# Patient Record
Sex: MALE | Race: WHITE | NOT HISPANIC OR LATINO | Employment: FULL TIME | URBAN - METROPOLITAN AREA
[De-identification: names, ages, dates, MRNs, and addresses within clinical notes are randomized per-mention and may not be internally consistent; named-entity substitution may affect disease eponyms.]

---

## 2017-03-06 ENCOUNTER — OFFICE VISIT - RIVER FALLS (OUTPATIENT)
Dept: FAMILY MEDICINE | Facility: CLINIC | Age: 27
End: 2017-03-06

## 2017-03-06 ASSESSMENT — MIFFLIN-ST. JEOR: SCORE: 1966.86

## 2018-07-19 ENCOUNTER — OFFICE VISIT - RIVER FALLS (OUTPATIENT)
Dept: FAMILY MEDICINE | Facility: CLINIC | Age: 28
End: 2018-07-19

## 2018-07-30 ENCOUNTER — OFFICE VISIT - RIVER FALLS (OUTPATIENT)
Dept: FAMILY MEDICINE | Facility: CLINIC | Age: 28
End: 2018-07-30

## 2018-07-30 ASSESSMENT — MIFFLIN-ST. JEOR: SCORE: 1910.61

## 2020-10-12 ENCOUNTER — AMBULATORY - RIVER FALLS (OUTPATIENT)
Dept: FAMILY MEDICINE | Facility: CLINIC | Age: 30
End: 2020-10-12
Payer: COMMERCIAL

## 2021-04-22 ENCOUNTER — AMBULATORY - RIVER FALLS (OUTPATIENT)
Dept: FAMILY MEDICINE | Facility: CLINIC | Age: 31
End: 2021-04-22
Payer: COMMERCIAL

## 2021-05-20 ENCOUNTER — AMBULATORY - RIVER FALLS (OUTPATIENT)
Dept: FAMILY MEDICINE | Facility: CLINIC | Age: 31
End: 2021-05-20
Payer: COMMERCIAL

## 2021-06-02 ENCOUNTER — RECORDS - HEALTHEAST (OUTPATIENT)
Dept: ADMINISTRATIVE | Facility: CLINIC | Age: 31
End: 2021-06-02

## 2022-02-11 VITALS
HEIGHT: 71 IN | TEMPERATURE: 98.8 F | HEART RATE: 65 BPM | TEMPERATURE: 98.2 F | SYSTOLIC BLOOD PRESSURE: 133 MMHG | DIASTOLIC BLOOD PRESSURE: 72 MMHG | HEART RATE: 74 BPM | DIASTOLIC BLOOD PRESSURE: 88 MMHG | SYSTOLIC BLOOD PRESSURE: 112 MMHG | WEIGHT: 208 LBS | BODY MASS INDEX: 29.12 KG/M2 | WEIGHT: 209 LBS

## 2022-02-11 VITALS
HEART RATE: 74 BPM | SYSTOLIC BLOOD PRESSURE: 116 MMHG | BODY MASS INDEX: 30.85 KG/M2 | DIASTOLIC BLOOD PRESSURE: 72 MMHG | HEIGHT: 71 IN | WEIGHT: 220.4 LBS | TEMPERATURE: 98.1 F

## 2022-02-16 NOTE — PROGRESS NOTES
Chief Complaint    F/u left arm cellulitis 7/19/18. Still c/o pain.  History of Present Illness      Chief complaint as above reviewed and confirmed with patient.  Pt presents to the clinic with concerns re: recheck of celluliits.  He was seen 7-19-18.  He admits he did not take the clindamycin regularily, had to take 4 x per day. has about 8 tablets left he thinks.   Generally much better with decreased erythema, pain and swelling.  has noted some firmness to the veins of the forearm.  Had some axillary tenderness last week which is improved this week.  no fevers.  Generally much better but not resolved.  not certain what caused the problem to begin with. Dad has a history of DVT and concerned the firmness in the vein could respresent a venous thrombosis.  Review of Systems      Review of systems is negative with the exception of those noted in HPI          Physical Exam   Vitals & Measurements    T: 98.2   F (Tympanic)  HR: 74(Peripheral)  BP: 112/72     HT: 71 in  WT: 208 lb  BMI: 29.01       exam of the LUE reveals minimal pink erythema medial elbow, ulnar forearm to the mid forearm and extending proximal to the elbow about 6 cm medially. no lateral erythema. no swelling noted at this time. no joint effusion. no open wounds.  There is a slight induration of the R medial and dosal forearm along a superficial vein that is about 2 cm in lenght but quite minimal at this time.  pt notes it is improved from several days ago.       peripheral pulses intact, cap refill brisk.          Assessment/Plan       1. Right arm cellulitis (L03.113)        additional 7 days of abx given minimal pinkness remaining though generally much improved.  doxycycline chosen as pt does not feel he can take abx 4 x per day or even 3 x per day.  warm compresses, elevation.  At this time no sigificant swelling, not likely DVT given UE no trauma or venous cannulation.  the firmness may be superficial thrombophlebitis but also given infection  may be more of a lymphangitis which owuld account for the tenderness in the axilla as well that is currently resolved. recheck if worsening. if worsening swelling consider US.       Orders:         doxycycline, 1 tab(s) ( 100 mg ), PO, bid, # 14 tab(s), 0 Refill(s), Type: Maintenance, Pharmacy: [facility], 1 tab(s) Oral bid,x7 day(s), (Ordered)  Patient Information     Name:BLAKE SOLOMON      Address:      [address] [place] [id number]     Sex:Male     YOB: 1990     [phone]     Emergency Contact:YONY SOLOMON     MRN:[id number]     FIN:[id number]     Location:[facility]     Date of Service:07/30/2018      Primary Care Physician:       TOMEKA       Attending Physician:       Anali GERMAIN, Manju HARVEY, [phone]  Problem List/Past Medical History    Ongoing     Asthma     Nodule       Comments: in the left submentum     Obesity    Historical     Otitis media, recurrent  Procedure/Surgical History     Herniorrhaphy (1991)            Comments:      bilateral     Myringotomy and insertion of T tube (01/1990)        Medications     clindamycin 150 mg oral capsule: 150 mg, 1 cap(s), PO, qid, for 10 day(s), 40 cap(s), 0 Refill(s).     doxycycline monohydrate 100 mg oral tablet: 100 mg, 1 tab(s), PO, bid, for 7 day(s), 14 tab(s), 0 Refill(s).          Allergies    Augmentin    Ceclor    Septra  Social History    Smoking Status - 07/30/2018     Never smoker     Tobacco      Never, 05/16/2013  Immunizations      Vaccine Date Status      tetanus/diphth/pertuss (Tdap) adult/adol 03/17/2016 Given      influenza virus vaccine, inactivated 10/19/2015 Given      influenza virus vaccine, inactivated 09/08/2014 Given      influenza, H1N1, inactivated 11/18/2009 Recorded      tetanus/diphth/pertuss (Tdap) adult/adol 03/07/2006 Recorded      Hep B 03/05/2001 Recorded      Hep B 11/09/2000 Recorded      Hep B 09/14/2000 Recorded      influenza  10/13/1997 Recorded      influenza 10/15/1996 Recorded      MMR (measles/mumps/rubella) 04/26/1993 Recorded      OPV 06/07/1990 Recorded      DTaP 06/07/1990 Recorded      OPV 01/15/1990 Recorded      DTaP 01/15/1990 Recorded      MMR (measles/mumps/rubella) 10/10/1989 Recorded      DTaP 01/12/1989 Recorded      OPV 1988 Recorded      DTaP 1988 Recorded      OPV 1988 Recorded      DTaP 1988 Recorded

## 2022-02-16 NOTE — PROGRESS NOTES
Chief Complaint    left elbow pain, started last week, getting red and painful - no injury  History of Present Illness      Patient complains of a tender erythematous area along the inner aspect of elbow for about the past week.  No injury or trauma.  No fever or chills.  It is more tender than painful.  Review of Systems      Patient has a history of MRSA.  No fevers, chills, myalgias, headache, cough, dyspnea.  Physical Exam   Vitals & Measurements    T: 98.8   F (Tympanic)  HR: 65(Peripheral)  BP: 133/88       Patient appears comfortable and in no distress.  Alert and oriented.  Clear.  Heart exam regular.  Left arm has a faint erythematous rash inner aspect extending 6 several centimeters both above and below the elbow.  No palpable cord.  Mild tenderness.  A photograph is taken using capture.  Assessment/Plan       Cellulitis of left arm(L03.114)        Given his history of is a will treat with and does not tolerate sulfa.  The lesion looks more suggestive of strep and staph.  Return if fever, illness, not improving in 2-3 days or resolving.         Orders:          [id number] office outpatient visit 15 minutes (Charge), Quantity: 1, Cellulitis of left arm       Orders:         clindamycin, 1 cap(s) ( 150 mg ), PO, qid, x 10 day(s), # 40 cap(s), 0 Refill(s), Type: Acute, Pharmacy: [facility] [place], patient called this morning regarding drainage from ear and said he has amoxicillin to get filled today - per our records..., (Ordered)  Patient Information     Name:BLAKE SOLOMON      Address:      [address] [place] [address]      Sex:Male      YOB: 1990      [phone]      Emergency Contact:YONY SOLOMON     MRN:[id number]     FIN:[id number]     Location:[facility]     Date of Service:07/19/2018      Primary Care Physician:       NONE ,       Attending Physician:       Berlin Vann MD, [phone]  Problem List/Past Medical History    Ongoing     Asthma      Nodule       Comments: in the left submentum     Obesity    Historical     Otitis media, recurrent  Procedure/Surgical History     Herniorrhaphy (1991)     Myringotomy and insertion of T tube (01/1990)  Medications        clindamycin 150 mg oral capsule: 150 mg, 1 cap(s), PO, qid, for 10 day(s), 40 cap(s), 0 Refill(s).                Allergies    Augmentin    Ceclor    Septra  Social History    Smoking Status - 07/19/2018     Never smoker     Tobacco      Never, 05/16/2013  Immunizations      Vaccine Date Status      tetanus/diphth/pertuss (Tdap) adult/adol 03/17/2016 Given      influenza virus vaccine, inactivated 10/19/2015 Given      influenza virus vaccine, inactivated 09/08/2014 Given      influenza, H1N1, inactivated 11/18/2009 Recorded      tetanus/diphth/pertuss (Tdap) adult/adol 03/07/2006 Recorded      Hep B 03/05/2001 Recorded      Hep B 11/09/2000 Recorded      Hep B 09/14/2000 Recorded      influenza 10/13/1997 Recorded      influenza 10/15/1996 Recorded      MMR (measles/mumps/rubella) 04/26/1993 Recorded      OPV 06/07/1990 Recorded      DTaP 06/07/1990 Recorded      OPV 01/15/1990 Recorded      DTaP 01/15/1990 Recorded      MMR (measles/mumps/rubella) 10/10/1989 Recorded      DTaP 01/12/1989 Recorded      OPV 1988 Recorded      DTaP 1988 Recorded      OPV 1988 Recorded      DTaP 1988 Recorded

## 2022-02-16 NOTE — PROGRESS NOTES
Patient:   BLAKE SOLOMON            MRN: [id number]            FIN: [id number]               Age:   26 years     Sex:  Male     :  1990   Associated Diagnoses:   Left knee pain   Author:   Any Narvaez      Chief Complaint   3/6/2017 9:17 AM CST     Pt here for ongoing left knee pain x 3 months. No injuries that he knows of to the knee.        History of Present Illness   Symptoms and concerns as expressed in CC reviewed and confirmed with patient. He is a bowler. He tends to plan rather than slide his foot and feels like there has been wear and tear on his knee. Has bothered him about 2 months. No acute injury with bowling; however, he did slip and fall walking the dog about 1 month ago and feels pain below knee cap. Hurts if he is kneeling on the floor changing his child's diaper.  He is afraid of buckling but it has not buckled. Has used some ice and ibuprofen  No PMH knee surgery      Health Status   Allergies:    Allergic Reactions (Selected)  Severity Not Documented  Augmentin (No reactions were documented)  Ceclor (No reactions were documented)  Septra (No reactions were documented)   Medications:  (Selected)      Problem list:    All Problems  Obesity / SNOMED CT 0968830551 / Probable  Asthma / ICD-9-.90 / Confirmed  Nodule / ICD-9-.2 / Confirmed  in the left submentum  Resolved: Otitis media, recurrent / ICD-9-.9      Histories   Past Medical History:    Active  Nodule (782.2): Onset in  at 15 years.  Comments:  2012 CDT 10:48 AM CDT - Venus Martin  in the left submentum  Asthma (493.90)  Resolved  Otitis media, recurrent (382.9):  Resolved.   Family History:    No family history items have been selected or recorded.   Procedure history:    Herniorrhaphy (SNOMED CT 19255762) in  at 1 Years.  Comments:  2012 10:32 AM - Venus Martin  bilateral  Myringotomy and insertion of T tube (SNOMED CT 147041646) in the month of 1990 at 15 Months.   Social History:         Tobacco Assessment            Never        Physical Examination   Vital Signs   3/6/2017 9:17 AM CST Temperature Tympanic 98.1 DegF    Peripheral Pulse Rate 74 bpm    Pulse Site Radial artery    Systolic Blood Pressure 116 mmHg    Diastolic Blood Pressure 72 mmHg    Mean Arterial Pressure 87 mmHg    BP Site Right arm      Measurements from flowsheet : Measurements   3/6/2017 9:17 AM CST Height Measured - Standard 71 in    Weight Measured - Standard 220.4 lb    BSA 2.24 m2    Body Mass Index 30.74 kg/m2      General:  Alert and oriented, No acute distress.    Musculoskeletal:  Normal gait, left knee no swelling or wwarmth or redness or bruising  Is tender with palpation over the bursa, just below knee cap  No laxity noted with valgus or varus or ant/post drawer maneuvers.       Review / Management   Radiology results   X-ray, X-ray reviewed with patient, no abnormality noted.  Will await radiology over-read and if differs such that treatment would be altered,  will notify patient.       Impression and Plan   Diagnosis     Left knee pain (TWZ25-EG M25.562).     Patient Instructions:       Counseled: Patient, Regarding diagnosis, Regarding treatment, Regarding medications, Verbalized understanding, Suspect bursitis after fall, has been using ibuprofen, will have ortho eval  Doubt ACL , MCL or Posterior liga ment tear with bowling but likely strain, will have ortho further eval this as well.    Orders     Orders (Selected)   Outpatient Orders  Order  Referral (Request): 3/6/2017 9:33 AM CST, Referred to: Orthopaedics, Left knee pain.

## 2022-02-16 NOTE — TELEPHONE ENCOUNTER
---------------------  From: Kathleen Mauricio LPN (Phone Messages Pool ([id number]_[facility]))   Sent: 11/18/2020 5:05:51 PM CST  Subject: covid questions     Phone Message    PCP:   unknown      Time of Call:  4:53pm       Person Calling:  pt  Phone number:  [phone]    Returned call at: 4:58pm    Note:   Pt LM stating he has some covid questions.    Returned call and pt says his son had a friend stay overnight on Saturday. Friend's mom picked him up Sunday morning- friend's mom started having symptoms of covid Sunday afternoon and tested positive on Monday.    Pt is wondering what they should do- positive mom was only in their house less than 5 minutes on Sunday. Told pt that its possible that her son could be negative or he could have been positive and asymptomatic at their home. Suggested quarantine and video appt if symptoms develop.    Last office visit and reason:  7/13/18 cellulitis arm

## 2022-05-12 PROBLEM — J45.909 ASTHMA: Status: ACTIVE | Noted: 2022-05-12

## 2022-05-16 ENCOUNTER — OFFICE VISIT (OUTPATIENT)
Dept: FAMILY MEDICINE | Facility: CLINIC | Age: 32
End: 2022-05-16
Payer: COMMERCIAL

## 2022-05-16 VITALS
SYSTOLIC BLOOD PRESSURE: 136 MMHG | HEART RATE: 77 BPM | WEIGHT: 222.9 LBS | HEIGHT: 71 IN | DIASTOLIC BLOOD PRESSURE: 87 MMHG | TEMPERATURE: 97.6 F | BODY MASS INDEX: 31.21 KG/M2

## 2022-05-16 DIAGNOSIS — L98.9 SCALP LESION: ICD-10-CM

## 2022-05-16 DIAGNOSIS — R22.2 SUBCUTANEOUS MASS OF ABDOMINAL WALL: Primary | ICD-10-CM

## 2022-05-16 PROCEDURE — 99213 OFFICE O/P EST LOW 20 MIN: CPT | Performed by: FAMILY MEDICINE

## 2022-05-16 NOTE — PROGRESS NOTES
"  Assessment & Plan     Subcutaneous mass of abdominal wall  Subcutaneous mass with temperature up to include lipoma, epidermoid cyst, muscle abnormality  Ultrasound ordered for further evaluation  - [place] UNLISTED ULTRASOUND PROCEDURE; Future    Scalp lesion  Scalp lesions appear to be junctional nevi, return as needed for removal               BMI:   Estimated body mass index is 31.09 kg/m  as calculated from the following:    Height as of this encounter: 1.803 m (5' 11\").    Weight as of this encounter: 101.1 kg (222 lb 14.4 oz).           No follow-ups on file.    Samm Anthony MD  [facility]    Luis Thompson is a 31 year old who presents for the following health issues  accompanied by his spouse.    History of Present Illness       Back Pain:  He presents for follow up of back pain. Patient's back pain is a recurring problem.  Location of back pain:  Right lower back and left lower back  Description of back pain: dull ache and shooting  Back pain spreads: right buttocks and left buttocks    Since patient first noticed back pain, pain is: always present, but gets better and worse  Does back pain interfere with his job:  No      Reason for visit:  Lump check  Symptom onset:  More than a month  Symptoms include:  Discomfort  Symptom intensity:  Mild  Symptom progression:  Worsening  Had these symptoms before:  No    He eats 0-1 servings of fruits and vegetables daily.He consumes 2 sweetened beverage(s) daily.He exercises with enough effort to increase his heart rate 10 to 19 minutes per day.  He exercises with enough effort to increase his heart rate 3 or less days per week.   He is taking medications regularly.       Check lump on right side abdomen.  Noticed about a year ago and is having increased discomfort.  He has 2 large nevi on the scalp he like to have checked today.  Also has questions on preventive health measures      Review of Systems   Constitutional, HEENT, " "cardiovascular, pulmonary, gi and gu systems are negative, except as otherwise noted.      Objective    /87 (BP Location: Right arm, Cuff Size: Adult Large)   Pulse 77   Temp 97.6  F (36.4  C) (Tympanic)   Ht 1.803 m (5' 11\")   Wt 101.1 kg (222 lb 14.4 oz)   BMI 31.09 kg/m    Body mass index is 31.09 kg/m .  Physical Exam   Alert, oriented, no acute distress  Ear canals patent, TMs clear  Lungs are clear  Heart has a regular rhythm  Abdomen soft, nontender, 1.5 cm nontender, mobile mass right lower abdomen  Skin exam reveals two 6 mm nonpigmented lesions on the left parietal scalp                "

## 2022-06-03 ENCOUNTER — TELEPHONE (OUTPATIENT)
Dept: FAMILY MEDICINE | Facility: CLINIC | Age: 32
End: 2022-06-03
Payer: COMMERCIAL

## 2022-06-03 DIAGNOSIS — R22.2 SUBCUTANEOUS MASS OF ABDOMINAL WALL: Primary | ICD-10-CM

## 2022-06-03 NOTE — TELEPHONE ENCOUNTER
Reason for Call:  Order     Detailed comments: Gisselle from [facility] calling she is stating that the referral needs a different diagnosis code and they are a little confused as to what needs to be looked at. Please return call to Gisselle @ [phone]     Phone Number Patient can be reached at: Other phone number:  [phone]    Best Time: any     Can we leave a detailed message on this number? Not Applicable    Call taken on 6/3/2022 at 12:26 PM by Radha Garcia

## 2022-06-06 NOTE — TELEPHONE ENCOUNTER
[facility] returned call to Gisselle, call was disconnected.   Will try calling later.

## 2022-06-07 ENCOUNTER — ANCILLARY PROCEDURE (OUTPATIENT)
Dept: RADIOLOGY | Facility: CLINIC | Age: 32
End: 2022-06-07
Attending: FAMILY MEDICINE
Payer: COMMERCIAL

## 2022-06-07 NOTE — TELEPHONE ENCOUNTER
Left message for a return call - ? Regarding ultrasound that was ordered at visit last month for abd mass?  See GTG note

## 2022-06-07 NOTE — TELEPHONE ENCOUNTER
TC received from [facility], requesting an updated ultrasound order that specifies exact location they are do complete the procedure on.   Please place updated order.       OV 5/16/22 subcutaneous mass of abdominal wall GTG

## 2022-06-07 NOTE — TELEPHONE ENCOUNTER
Jay calling wondering if this order can be sent as soon as possible he was scheduled today but was unable to have ultrasound done as the order was wrong. Please send new order to [facility].

## 2022-07-17 ENCOUNTER — HEALTH MAINTENANCE LETTER (OUTPATIENT)
Age: 32
End: 2022-07-17

## 2022-09-25 ENCOUNTER — HEALTH MAINTENANCE LETTER (OUTPATIENT)
Age: 32
End: 2022-09-25

## 2022-11-28 PROBLEM — J45.909 ASTHMA: Status: RESOLVED | Noted: 2022-05-12 | Resolved: 2022-11-28

## 2023-08-05 ENCOUNTER — HEALTH MAINTENANCE LETTER (OUTPATIENT)
Age: 33
End: 2023-08-05

## 2024-09-28 ENCOUNTER — HEALTH MAINTENANCE LETTER (OUTPATIENT)
Age: 34
End: 2024-09-28